# Patient Record
Sex: FEMALE | Race: WHITE | Employment: FULL TIME | URBAN - NONMETROPOLITAN AREA
[De-identification: names, ages, dates, MRNs, and addresses within clinical notes are randomized per-mention and may not be internally consistent; named-entity substitution may affect disease eponyms.]

---

## 2021-06-06 ENCOUNTER — APPOINTMENT (OUTPATIENT)
Dept: GENERAL RADIOLOGY | Age: 41
End: 2021-06-06
Payer: COMMERCIAL

## 2021-06-06 ENCOUNTER — HOSPITAL ENCOUNTER (EMERGENCY)
Age: 41
Discharge: HOME | End: 2021-06-06
Attending: FAMILY MEDICINE
Payer: COMMERCIAL

## 2021-06-06 VITALS
WEIGHT: 184 LBS | DIASTOLIC BLOOD PRESSURE: 63 MMHG | HEIGHT: 68 IN | BODY MASS INDEX: 27.89 KG/M2 | OXYGEN SATURATION: 98 % | RESPIRATION RATE: 18 BRPM | SYSTOLIC BLOOD PRESSURE: 142 MMHG | HEART RATE: 81 BPM | TEMPERATURE: 98.4 F

## 2021-06-06 DIAGNOSIS — S60.221A CONTUSION OF RIGHT HAND, INITIAL ENCOUNTER: Primary | ICD-10-CM

## 2021-06-06 PROCEDURE — 73130 X-RAY EXAM OF HAND: CPT

## 2021-06-06 PROCEDURE — 99282 EMERGENCY DEPT VISIT SF MDM: CPT

## 2021-06-06 RX ORDER — IBUPROFEN AND FAMOTIDINE 800; 26.6 MG/1; MG/1
TABLET, COATED ORAL
COMMUNITY

## 2021-06-06 ASSESSMENT — PAIN DESCRIPTION - ORIENTATION: ORIENTATION: RIGHT

## 2021-06-06 ASSESSMENT — PAIN DESCRIPTION - DESCRIPTORS: DESCRIPTORS: PINS AND NEEDLES

## 2021-06-06 ASSESSMENT — PAIN SCALES - GENERAL: PAINLEVEL_OUTOF10: 6

## 2021-06-06 ASSESSMENT — PAIN DESCRIPTION - LOCATION: LOCATION: ARM

## 2021-06-06 NOTE — ED TRIAGE NOTES
Pt hit something last night, pt has pain in right forearm and hand with swelling and \"pins and needles\" in hand. Ecchymosis noted on forearm and hand.

## 2021-06-07 NOTE — ED PROVIDER NOTES
[facility]  eMERGENCY dEPARTMENT eNCOUnter          CHIEF COMPLAINT       Chief Complaint   Patient presents with    Hand Injury       Nurses Notes reviewed and I agree except as noted in the HPI. HISTORY OF PRESENT ILLNESS    Constantine Booth is a 41 y.o. female who presents to the emergency room via private vehicle, patient complained of pain to her right hand, patient states that she \"hit something\", day prior approximately 2030 hrs., since that time has a lot of pain is some subtle swelling and bruising indicating the ulnar lateral aspect of her right hand. Denies other injury. Patient rates the discomfort 6 out of 10, with occasional pins-and-needles feeling in the fifth finger. PCP: Ernie    REVIEW OF SYSTEMS     Review of Systems   All other systems reviewed and are negative. PAST MEDICAL HISTORY    has no past medical history on file. SURGICAL HISTORY      has no past surgical history on file. CURRENT MEDICATIONS       Discharge Medication List as of 6/6/2021  1:47 PM      CONTINUE these medications which have NOT CHANGED    Details   ibuprofen-famotidine (DUEXIS) 800-26.6 MG TABS Take by mouthHistorical Med             ALLERGIES     has No Known Allergies. FAMILY HISTORY     has no family status information on file. family history is not on file. SOCIAL HISTORY          PHYSICAL EXAM     INITIAL VITALS:  height is 5' 8\" (1.727 m) and weight is 184 lb (83.5 kg). Her oral temperature is 98.4 °F (36.9 °C). Her blood pressure is 142/63 (abnormal) and her pulse is 81. Her respiration is 18 and oxygen saturation is 98%. Physical Exam   Constitutional: Patient is oriented to person, place, and time. Patient appears well-developed and well-nourished. Patient is active and cooperative. Neck: Full passive range of motion without pain and phonation normal.   Cardiovascular:  Normal rate, regular rhythm and intact distal pulses.      Pulses: Right acknowledges    Imaging reviewed, radiology report reviewed    Discussed with patient imaging findings, discussed contusion of the right hand, discussed ice as desired, Motrin/Tylenol as needed, follow-up with primary care, return to ER symptoms change worse other concerns, acknowledged    FINAL IMPRESSION      1. Contusion of right hand, initial encounter          DISPOSITION/PLAN   D/c    PATIENT REFERRED TO:  Marycarmen Singh MD  [address] [place]  [phone]    [facility] ED  [address] [place] [address] [id number] [phone]    As needed, If symptoms worsen      DISCHARGE MEDICATIONS:  Discharge Medication List as of 6/6/2021  1:47 PM              Summation      Patient Course:  D/c    ED Medications administered this visit:  Medications - No data to display    New Prescriptions from this visit:    Discharge Medication List as of 6/6/2021  1:47 PM          Follow-up:  Marycarmen Singh MD  [address] [place]  [phone]    [facility] ED  [address] [place] [id number]  [phone]    As needed, If symptoms worsen        Final Impression:   1.  Contusion of right hand, initial encounter               (Please note that portions of this note were completed with a voice recognition program.  Efforts were made to edit the dictations but occasionally words are mis-transcribed.)    MD Janae Billy MD  06/07/21 [id number]